# Patient Record
Sex: MALE | Race: WHITE | NOT HISPANIC OR LATINO
[De-identification: names, ages, dates, MRNs, and addresses within clinical notes are randomized per-mention and may not be internally consistent; named-entity substitution may affect disease eponyms.]

---

## 2022-04-15 PROBLEM — Z00.00 ENCOUNTER FOR PREVENTIVE HEALTH EXAMINATION: Status: ACTIVE | Noted: 2022-04-15

## 2022-06-01 ENCOUNTER — APPOINTMENT (OUTPATIENT)
Dept: ORTHOPEDIC SURGERY | Facility: CLINIC | Age: 37
End: 2022-06-01
Payer: OTHER MISCELLANEOUS

## 2022-06-01 PROCEDURE — 99214 OFFICE O/P EST MOD 30 MIN: CPT | Mod: 57

## 2022-06-01 PROCEDURE — 99072 ADDL SUPL MATRL&STAF TM PHE: CPT

## 2022-06-06 NOTE — DISCUSSION/SUMMARY
[de-identified] : I have personally reviewed all previous images as well as imaging reports.  I have reviewed any previous medical records including physical therapy reports, and reports from referring physicians.  We have come to a probable diagnosis.  The patient was explained the diagnosis and treatment options for the condition in great detail.  We discussed the use of injectable as well as oral medications and steroids, physical therapy, oral and topical anti-inflammatories other modalities including surgical treatment.    After reviewing all clinical information provided as well as the available imaging studies, I recommended activity modification and avoidance conditions leading to pain.  We discussed the possibility of prescription strength anti-inflammatory medications as well as use of over-the-counter medications.  We also discussed surgical options to treat this problem.

## 2022-06-06 NOTE — ASSESSMENT
[FreeTextEntry1] : Consent:  Conservative treatment, nontreatment, nonsurgical intervention and surgical intervention treatment options have been reviewed with the patient.  The patient continues to be symptomatic and has failed conservative treatment, and elects to move forward with surgical intervention.  The patient is indicated for left shoulder arthroscopic rotator cuff repair, biceps tenotomy vs tenodesis, subacromial decompression, acromioplasty (, distal clavicle excision) and all indicated procedures. As such the alternatives, benefits and risks, of the above procedure, including but not limited to bleeding, infection, neurovascular injury, loss of limb, loss of life,  DVT, PE, RSD, inability to return to previous level of activity, inability to return to previous level of employment, advancement of or to osteoarthritic changes, joint instability or motion loss, hardware failure or migration, (biceps pain cramping weakness or deformity,) failure to resolve all symptoms, failure to return to sports and need for further procedures, as well as specific risk of need for future joint arthroplasty were discussed with the patient and/or their legal guardian who agreed to move forward with surgical intervention.  They have reviewed and signed the consent form today after expressing understanding of the above documented conversation. The patient or their representative will contact my office as instructed on the preoperative instruction sheet they received today to schedule surgery in a timely manner as discussed.

## 2022-06-06 NOTE — HISTORY OF PRESENT ILLNESS
[7] : 7 [Dull/Aching] : dull/aching [de-identified] : 06/01/2022 \par \par JAY is presenting today for followup. Pain and symptoms are about the same since his last visit. He has been going to PT, but states that despite multiple visits, his pain persists. He has tried injections, medications, and PT extensively at this point. He would like to discuss surgical options. He denies any numbness/tingling/fevers/chills.  [FreeTextEntry1] : Left Shoulder  [de-identified] : PT  [FreeTextEntry5] : Osmani is here to F/U on his LT Shoulder injury.\par Been going to PT But the pain is still in the shoulder \par Range and use of the shoulder is well.\par Just cant shake the pain.\par Pain is at a 7

## 2022-06-06 NOTE — DATA REVIEWED
[MRI] : MRI [Left] : left [Shoulder] : shoulder [Report was reviewed and noted in the chart] : The report was reviewed and noted in the chart [I reviewed the films/CD] : I reviewed the films/CD [FreeTextEntry1] : 12/01/2021: 1. Tear of the superior labrum and posterior labrum extending below the equator with a linear 8 x 2 mm \par posterior superior labral cyst. No definitive involvement of the biceps anchor.\par 2. 4-mm traction cyst and traction edema of infraspinatus proximal to the insertion with no fracture or tear.\par 3. Capsular thickening, which can be seen with adhesive capsulitis.

## 2022-08-08 ENCOUNTER — OUTPATIENT (OUTPATIENT)
Dept: OUTPATIENT SERVICES | Facility: HOSPITAL | Age: 37
LOS: 1 days | End: 2022-08-08
Payer: COMMERCIAL

## 2022-08-08 VITALS
TEMPERATURE: 98 F | SYSTOLIC BLOOD PRESSURE: 144 MMHG | DIASTOLIC BLOOD PRESSURE: 86 MMHG | OXYGEN SATURATION: 98 % | HEART RATE: 68 BPM | WEIGHT: 210.1 LBS | HEIGHT: 68 IN | RESPIRATION RATE: 16 BRPM

## 2022-08-08 DIAGNOSIS — Z01.818 ENCOUNTER FOR OTHER PREPROCEDURAL EXAMINATION: ICD-10-CM

## 2022-08-08 DIAGNOSIS — M67.819 OTHER SPECIFIED DISORDERS OF SYNOVIUM AND TENDON, UNSPECIFIED SHOULDER: ICD-10-CM

## 2022-08-08 DIAGNOSIS — M67.80 OTHER SPECIFIED DISORDERS OF SYNOVIUM AND TENDON, UNSPECIFIED SITE: ICD-10-CM

## 2022-08-08 PROCEDURE — G0463: CPT

## 2022-08-08 NOTE — H&P PST ADULT - HISTORY OF PRESENT ILLNESS
38 yo male states that he  injured left shoulder in October of 2021.  Initially treated with cortisone injection x 1 and PT, with no relief.  Pain increases with ROM of right arm.  Mild relief with Ibuprofen.

## 2022-08-08 NOTE — H&P PST ADULT - PROBLEM SELECTOR PLAN 1
LEFT SHOULDER ARTHROSCOPY TOTATOR CUFF REPAIR LABRAL REPAIR SUBACROMIAL DECOMPRESSION BICEPS TENODESIS POSSIBLE DISTAL CLAVICLE EXCISION

## 2022-08-17 ENCOUNTER — OUTPATIENT (OUTPATIENT)
Dept: OUTPATIENT SERVICES | Facility: HOSPITAL | Age: 37
LOS: 1 days | End: 2022-08-17
Payer: COMMERCIAL

## 2022-08-17 DIAGNOSIS — Z20.828 CONTACT WITH AND (SUSPECTED) EXPOSURE TO OTHER VIRAL COMMUNICABLE DISEASES: ICD-10-CM

## 2022-08-17 LAB — SARS-COV-2 RNA SPEC QL NAA+PROBE: SIGNIFICANT CHANGE UP

## 2022-08-17 PROCEDURE — U0005: CPT

## 2022-08-17 PROCEDURE — U0003: CPT

## 2022-08-19 ENCOUNTER — OUTPATIENT (OUTPATIENT)
Dept: OUTPATIENT SERVICES | Facility: HOSPITAL | Age: 37
LOS: 1 days | End: 2022-08-19
Payer: COMMERCIAL

## 2022-08-19 ENCOUNTER — TRANSCRIPTION ENCOUNTER (OUTPATIENT)
Age: 37
End: 2022-08-19

## 2022-08-19 ENCOUNTER — APPOINTMENT (OUTPATIENT)
Dept: ORTHOPEDIC SURGERY | Facility: HOSPITAL | Age: 37
End: 2022-08-19

## 2022-08-19 VITALS
TEMPERATURE: 97 F | SYSTOLIC BLOOD PRESSURE: 136 MMHG | WEIGHT: 218.48 LBS | RESPIRATION RATE: 27 BRPM | DIASTOLIC BLOOD PRESSURE: 80 MMHG | HEIGHT: 66 IN | HEART RATE: 76 BPM

## 2022-08-19 VITALS
SYSTOLIC BLOOD PRESSURE: 112 MMHG | RESPIRATION RATE: 20 BRPM | DIASTOLIC BLOOD PRESSURE: 64 MMHG | HEART RATE: 81 BPM | OXYGEN SATURATION: 93 %

## 2022-08-19 DIAGNOSIS — M67.819 OTHER SPECIFIED DISORDERS OF SYNOVIUM AND TENDON, UNSPECIFIED SHOULDER: ICD-10-CM

## 2022-08-19 PROCEDURE — C1713: CPT

## 2022-08-19 PROCEDURE — 29826 SHO ARTHRS SRG DECOMPRESSION: CPT | Mod: LT

## 2022-08-19 PROCEDURE — 29806 SHO ARTHRS SRG CAPSULORRAPHY: CPT | Mod: LT

## 2022-08-19 PROCEDURE — 29807 SHO ARTHRS SRG RPR SLAP LES: CPT | Mod: 59,LT

## 2022-08-19 DEVICE — SUT ANCHOR FIBERTAK NO2: Type: IMPLANTABLE DEVICE | Site: LEFT | Status: FUNCTIONAL

## 2022-08-19 DEVICE — IMP SYS BIOCOMP PUSHLOCK SHRT 2.9MM: Type: IMPLANTABLE DEVICE | Site: LEFT | Status: FUNCTIONAL

## 2022-08-19 DEVICE — IMPLANTABLE DEVICE: Type: IMPLANTABLE DEVICE | Site: LEFT | Status: FUNCTIONAL

## 2022-08-19 DEVICE — ANCHOR BIO-COMP 3 FIBERWIRE 5.5X14.7MM: Type: IMPLANTABLE DEVICE | Site: LEFT | Status: FUNCTIONAL

## 2022-08-19 RX ORDER — SODIUM CHLORIDE 9 MG/ML
1000 INJECTION, SOLUTION INTRAVENOUS
Refills: 0 | Status: DISCONTINUED | OUTPATIENT
Start: 2022-08-19 | End: 2022-08-19

## 2022-08-19 RX ORDER — CHLORHEXIDINE GLUCONATE 213 G/1000ML
1 SOLUTION TOPICAL ONCE
Refills: 0 | Status: COMPLETED | OUTPATIENT
Start: 2022-08-19 | End: 2022-08-19

## 2022-08-19 RX ORDER — ONDANSETRON 4 MG/1
4 TABLET, ORALLY DISINTEGRATING ORAL EVERY 8 HOURS
Qty: 21 | Refills: 0 | Status: ACTIVE | COMMUNITY
Start: 2022-08-19 | End: 1900-01-01

## 2022-08-19 RX ORDER — ACETAMINOPHEN EXTRA STRENGTH 500 MG/1
500 TABLET ORAL 3 TIMES DAILY
Qty: 90 | Refills: 0 | Status: ACTIVE | COMMUNITY
Start: 2022-08-19 | End: 1900-01-01

## 2022-08-19 RX ORDER — APREPITANT 80 MG/1
40 CAPSULE ORAL ONCE
Refills: 0 | Status: COMPLETED | OUTPATIENT
Start: 2022-08-19 | End: 2022-08-19

## 2022-08-19 RX ORDER — CEFAZOLIN SODIUM 1 G
2000 VIAL (EA) INJECTION ONCE
Refills: 0 | Status: COMPLETED | OUTPATIENT
Start: 2022-08-19 | End: 2022-08-19

## 2022-08-19 RX ORDER — HYDROMORPHONE HYDROCHLORIDE 2 MG/ML
0.5 INJECTION INTRAMUSCULAR; INTRAVENOUS; SUBCUTANEOUS
Refills: 0 | Status: DISCONTINUED | OUTPATIENT
Start: 2022-08-19 | End: 2022-08-19

## 2022-08-19 RX ORDER — OXYCODONE HYDROCHLORIDE 5 MG/1
5 TABLET ORAL ONCE
Refills: 0 | Status: DISCONTINUED | OUTPATIENT
Start: 2022-08-19 | End: 2022-08-19

## 2022-08-19 RX ORDER — ONDANSETRON 8 MG/1
4 TABLET, FILM COATED ORAL ONCE
Refills: 0 | Status: COMPLETED | OUTPATIENT
Start: 2022-08-19 | End: 2022-08-19

## 2022-08-19 RX ADMIN — CHLORHEXIDINE GLUCONATE 1 APPLICATION(S): 213 SOLUTION TOPICAL at 06:38

## 2022-08-19 RX ADMIN — ONDANSETRON 4 MILLIGRAM(S): 8 TABLET, FILM COATED ORAL at 11:21

## 2022-08-19 RX ADMIN — APREPITANT 40 MILLIGRAM(S): 80 CAPSULE ORAL at 06:37

## 2022-08-19 NOTE — ASU PATIENT PROFILE, ADULT - FALL HARM RISK - UNIVERSAL INTERVENTIONS
Bed in lowest position, wheels locked, appropriate side rails in place/Call bell, personal items and telephone in reach/Instruct patient to call for assistance before getting out of bed or chair/Non-slip footwear when patient is out of bed/[place] to call system/Physically safe environment - no spills, clutter or unnecessary equipment/Purposeful Proactive Rounding/Room/bathroom lighting operational, light cord in reach

## 2022-08-19 NOTE — ASU DISCHARGE PLAN (ADULT/PEDIATRIC) - NS MD DC FALL RISK RISK
For information on Fall & Injury Prevention, visit: [email or web address] OR  [email or web address] OR  https://www.cdc.gov/steadi/patient.html

## 2022-08-31 ENCOUNTER — APPOINTMENT (OUTPATIENT)
Dept: ORTHOPEDIC SURGERY | Facility: CLINIC | Age: 37
End: 2022-08-31

## 2022-08-31 PROCEDURE — 99024 POSTOP FOLLOW-UP VISIT: CPT

## 2022-08-31 RX ORDER — OXYCODONE 5 MG/1
5 TABLET ORAL
Qty: 20 | Refills: 0 | Status: ACTIVE | COMMUNITY
Start: 2022-08-19 | End: 1900-01-01

## 2022-08-31 NOTE — PHYSICAL EXAM
[de-identified] : No erythema, no discharge, no increased warmth to touch. Sutures intact. ROM consistent with immobility due to brace, strength testing deferred due to post-op status. Steri-Strips applied. Distally neurovascularly intact with median, radial, ulnar, MSC, axillary nerves intact. 2+ radial pulse with capillary refill >2 seconds

## 2022-08-31 NOTE — DISCUSSION/SUMMARY
[de-identified] : Assessment & Plan: The patient is approximately 2 weeks s/p LT SLAP repair .Sutures removed and Steri Strips applied today. The patient is instructed in wound management. The patient's post-op plan, protocol and activity modifications have been thoroughly discussed and the patient expressed understanding. The patient will control pain as discussed & continue ice and elevation as needed. The patient otherwise may advance activity as discussed. Continue sling use and will start PT. Will also renew oxycodone. Follow up in 4 weeks.\par \par All of the patient's questions were answered to His satisfaction. Diagnoses and potential treatments were reviewed. He agreed with the plan and would like to move forward with it. \par \par History, physical exam, imaging, assessment and plan documented by John Paul Butler. The documentation recorded by the scribe accurately reflects the service I, Pedro Gallardo MD, personally performed and the decisions made by me.

## 2022-08-31 NOTE — HISTORY OF PRESENT ILLNESS
[Dull/Aching] : dull/aching [Sharp] : sharp [Constant] : constant [Leisure] : leisure [Sleep] : sleep [Work related] : work related [de-identified] : Pt presents for POST OP #1 DOS: 8/19/2022 LT SLAP repair. Pt denies fever, no chills, no loss of sensation to extremity. Using shoulder sling. [] : no [FreeTextEntry1] : left shoulder  [FreeTextEntry3] : 10/28/21 [FreeTextEntry5] : pt states pain is bad today. pain with and without movement  [de-identified] : movement  [de-identified] : 08/19/22 [de-identified] : L shoulder labral repair

## 2022-09-01 PROBLEM — J45.990 EXERCISE INDUCED BRONCHOSPASM: Chronic | Status: ACTIVE | Noted: 2022-08-08

## 2022-09-01 PROBLEM — M25.512 PAIN IN LEFT SHOULDER: Chronic | Status: ACTIVE | Noted: 2022-08-08

## 2022-09-02 ENCOUNTER — RX RENEWAL (OUTPATIENT)
Age: 37
End: 2022-09-02

## 2022-09-02 RX ORDER — IBUPROFEN 600 MG/1
600 TABLET, FILM COATED ORAL 4 TIMES DAILY
Qty: 60 | Refills: 0 | Status: ACTIVE | COMMUNITY
Start: 2022-08-19 | End: 1900-01-01

## 2022-09-28 ENCOUNTER — APPOINTMENT (OUTPATIENT)
Dept: ORTHOPEDIC SURGERY | Facility: CLINIC | Age: 37
End: 2022-09-28

## 2022-09-28 PROCEDURE — 99024 POSTOP FOLLOW-UP VISIT: CPT

## 2022-09-28 NOTE — DISCUSSION/SUMMARY
[de-identified] : Assessment & Plan: The patient is approximately 6 weeks s/p LT SLAP repair . The patient is instructed in wound management. The patient's post-op plan, protocol and activity modifications have been thoroughly discussed and the patient expressed understanding. The patient will control pain as discussed & continue ice and elevation as needed. The patient otherwise may advance activity as discussed. \par \par Continue PT.\par May discontinue use of ultra sling.\par OOW at least 6 weeks, will re-evaluate.\par Follow up in 6 weeks.\par \par All of the patient's questions were answered to His satisfaction. Diagnoses and potential treatments were reviewed. He agreed with the plan and would like to move forward with it.\par \par History, physical exam, imaging, assessment and plan documented by John Paul Mendez. The documentation recorded by the scribe accurately reflects the service I, Pedro Gallardo MD, personally performed and the decisions made by me.

## 2022-09-28 NOTE — HISTORY OF PRESENT ILLNESS
[Work related] : work related [Dull/Aching] : dull/aching [Intermittent] : intermittent [Sleep] : sleep [Lying in bed] : lying in bed [Physical therapy] : physical therapy [de-identified] : Pt presents for POST OP #2 DOS: 8/19/2022 LT SLAP repair. Pt denies fever, no chills, no loss of sensation to extremity. Using shoulder sling. Pain has improved from previous visit. [] : no [FreeTextEntry3] : 10/28/21 [FreeTextEntry5] : pt states he is experiencing pain at night and with movement.  [de-identified] : physical therapy

## 2022-09-28 NOTE — PHYSICAL EXAM
[de-identified] : No erythema, no discharge, no increased warmth to touch. Healed incisions. ROM improving, appropriately stiff, strength testing deferred due to post-op status. Distally neurovascularly intact with median, radial, ulnar, MSC, axillary nerves intact. 2+ radial pulse with capillary refill >2 seconds.

## 2022-09-28 NOTE — WORK
[Total] : total [Does not reveal pre-existing condition(s) that may affect treatment/prognosis] : does not reveal pre-existing condition(s) that may affect treatment/prognosis [Cannot return to work because ________] : cannot return to work because [unfilled] [Unknown at this time] : : unknown at this time [Patient] : patient [I provided the services listed above] :  I provided the services listed above. [FreeTextEntry1] : will determine in 6 weeks

## 2022-11-09 ENCOUNTER — APPOINTMENT (OUTPATIENT)
Dept: ORTHOPEDIC SURGERY | Facility: CLINIC | Age: 37
End: 2022-11-09

## 2022-11-30 ENCOUNTER — APPOINTMENT (OUTPATIENT)
Dept: ORTHOPEDIC SURGERY | Facility: CLINIC | Age: 37
End: 2022-11-30

## 2022-11-30 PROCEDURE — 99024 POSTOP FOLLOW-UP VISIT: CPT

## 2022-11-30 RX ORDER — METHYLPREDNISOLONE 4 MG/1
4 TABLET ORAL
Qty: 1 | Refills: 0 | Status: ACTIVE | COMMUNITY
Start: 2022-11-30 | End: 1900-01-01

## 2022-11-30 NOTE — HISTORY OF PRESENT ILLNESS
[Work related] : work related [Dull/Aching] : dull/aching [Light duty] : Work status: light duty [de-identified] : Pt presents for POST OP #3 DOS: 8/19/2022 LT SLAP repair. Pt denies fever, no chills, no loss of sensation to extremity. Going to PT regularly, Pain has improved from previous visit. He has returned to work, light duty. [] : no [FreeTextEntry1] : left shoulder  [FreeTextEntry3] : 10/28/21 [FreeTextEntry5] : JAY is here today for left shoulder follow up. pt states since last visit, pain decreased. pain with lifting and reaching behind. \par s/p ~14 weeks L shoulder labral repair  [de-identified] : reaching/lifting  [de-identified] : 08/19/22

## 2022-11-30 NOTE — WORK
[Partial] : partial [Does not reveal pre-existing condition(s) that may affect treatment/prognosis] : does not reveal pre-existing condition(s) that may affect treatment/prognosis [Can return to work with limitations on ______] : can return to work with limitations on [unfilled] [Lifting] : lifting [Unknown at this time] : : unknown at this time [Patient] : patient [I provided the services listed above] :  I provided the services listed above. [FreeTextEntry1] : will determine in 6 weeks

## 2022-11-30 NOTE — PHYSICAL EXAM
[de-identified] : No erythema, no discharge, no increased warmth to touch. Healed incisions. ROM improving, appropriately stiff, strength testing deferred due to post-op status. Distally neurovascularly intact with median, radial, ulnar, MSC, axillary nerves intact. 2+ radial pulse with capillary refill >2 seconds.

## 2022-12-16 ENCOUNTER — TRANSCRIPTION ENCOUNTER (OUTPATIENT)
Age: 37
End: 2022-12-16

## 2023-01-18 ENCOUNTER — APPOINTMENT (OUTPATIENT)
Dept: ORTHOPEDIC SURGERY | Facility: CLINIC | Age: 38
End: 2023-01-18
Payer: OTHER MISCELLANEOUS

## 2023-01-18 VITALS — HEIGHT: 66 IN | WEIGHT: 220 LBS | BODY MASS INDEX: 35.36 KG/M2

## 2023-01-18 DIAGNOSIS — Z78.9 OTHER SPECIFIED HEALTH STATUS: ICD-10-CM

## 2023-01-18 PROCEDURE — 99072 ADDL SUPL MATRL&STAF TM PHE: CPT

## 2023-01-18 PROCEDURE — 99214 OFFICE O/P EST MOD 30 MIN: CPT

## 2023-01-18 NOTE — DISCUSSION/SUMMARY
[de-identified] : Assessment: The patient is a 38 year old male with L shoulder pain and physical exam findings consistent with s/p L shoulder labral repair.\par \par Patient and I discussed their symptoms. Discussed findings of today's exam and possible causes of patient's pain. Educated patient on their most probable diagnosis. Reviewed possible courses of treatment, and we collaboratively decided best course of treatment at this time will include:\par \par 1. MRI L shoulder\par 2. OOW\par \par The patient's current medication management of their orthopedic diagnosis was reviewed today: \par \par (1) We discussed a comprehensive treatment plan that included possible pharmaceutical management involving the use of prescription strength medications including but not limited to options such as oral Naprosyn 500mg BID, once daily Meloxicam 15 mg, or 500-650 mg Tylenol versus over the counter oral medications and topical prescription NSAID Pennsaid vs over the counter Voltaren gel. \par \par (2) There is a moderate risk of morbidity with further treatment, especially from use of prescription strength medications and possible side effects of these medications which include upset stomach with oral medications, skin reactions to topical medications and cardiac/renal issues with long term use. \par \par (3) I recommended that the patient follow-up with their medical physician to discuss any significant specific potential issues with long term medication use such as interactions with current medications or with exacerbation of underlying medical comorbidities. \par \par (4) The benefits and risks associated with use of injectable, oral or topical, prescription and over the counter anti-inflammatory medications were discussed with the patient. The patient voiced understanding of the risks including but not limited to bleeding, stroke, kidney dysfunction, heart disease, and were referred to the black box warning label for further information.\par \par Prior to appointment and during encounter with patient extensive medical records were reviewed including but not limited to, hospital records, out patient records, imaging results, and lab data. During this appointment the patient was examined, diagnoses were discussed and explained in a face to face manner. In addition extensive time was spent reviewing aforementioned diagnostic studies. Counseling including abnormal image results, differential diagnoses, treatment options, risk and benefits, lifestyle changes, current condition, and current medications was performed. Patient's comments, questions, and concerns were address and patient verbalized understanding.\par \par Follow up after MRI. \par \par History, physical exam, imaging, assessment and plan documented by John Paul Mendez. The documentation recorded by the scribe accurately reflects the service I, Pedro Gallardo MD, personally performed and the decisions made by me.

## 2023-01-18 NOTE — PHYSICAL EXAM
[de-identified] : No erythema, no discharge, no increased warmth to touch. Healed incisions. ROM improving, appropriately stiff, strength testing deferred due to post-op status. Distally neurovascularly intact with median, radial, ulnar, MSC, axillary nerves intact. 2+ radial pulse with capillary refill >2 seconds.

## 2023-01-18 NOTE — WORK
[Does not reveal pre-existing condition(s) that may affect treatment/prognosis] : does not reveal pre-existing condition(s) that may affect treatment/prognosis [Lifting] : lifting [Unknown at this time] : : unknown at this time [Patient] : patient [I provided the services listed above] :  I provided the services listed above. [Partial] : partial [Can return to work with limitations on ______] : can return to work with limitations on [unfilled] [No Rx restrictions] : No Rx restrictions.

## 2023-01-18 NOTE — HISTORY OF PRESENT ILLNESS
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 pt states since last visit, pain decreased. pain with lifting and reaching behind. \par s/p ~14 weeks L shoulder labral repair  [de-identified] : reaching/lifting  [de-identified] : 08/19/22

## 2023-01-31 ENCOUNTER — FORM ENCOUNTER (OUTPATIENT)
Age: 38
End: 2023-01-31

## 2023-02-01 ENCOUNTER — APPOINTMENT (OUTPATIENT)
Dept: MRI IMAGING | Facility: CLINIC | Age: 38
End: 2023-02-01
Payer: OTHER MISCELLANEOUS

## 2023-02-01 PROCEDURE — 99072 ADDL SUPL MATRL&STAF TM PHE: CPT

## 2023-02-01 PROCEDURE — 73221 MRI JOINT UPR EXTREM W/O DYE: CPT | Mod: LT

## 2023-02-08 ENCOUNTER — APPOINTMENT (OUTPATIENT)
Dept: ORTHOPEDIC SURGERY | Facility: CLINIC | Age: 38
End: 2023-02-08
Payer: OTHER MISCELLANEOUS

## 2023-02-08 PROCEDURE — 99072 ADDL SUPL MATRL&STAF TM PHE: CPT

## 2023-02-08 PROCEDURE — 20550 NJX 1 TENDON SHEATH/LIGAMENT: CPT

## 2023-02-08 PROCEDURE — 99214 OFFICE O/P EST MOD 30 MIN: CPT | Mod: 25

## 2023-02-14 NOTE — DISCUSSION/SUMMARY
[de-identified] : Assessment: The patient is a 38 year old male s/p left arthroscopic labral repair 8/19/2022 now with anterior shoulder pain that is new in onset and not similar to the symptoms he experienced previously. MRI is consistent with biceps tendonitis.\par \par Patient and I discussed their symptoms. Discussed findings of today's exam and possible causes of patient's pain. Educated patient on their most probable diagnosis. Reviewed possible courses of treatment, and we collaboratively decided best course of treatment at this time will include:\par \par 1. Will attempt a therapeutic and diagnostic biceps sheath CSI today under ultrasound guidance\par 2. PT\par 3. If no lasting improvement, will consider revision labral repair with possibility of biceps tenodesis.\par \par The patient's current medication management of their orthopedic diagnosis was reviewed today: \par \par (1) We discussed a comprehensive treatment plan that included possible pharmaceutical management involving the use of prescription strength medications including but not limited to options such as oral Naprosyn 500mg BID, once daily Meloxicam 15 mg, or 500-650 mg Tylenol versus over the counter oral medications and topical prescription NSAID Pennsaid vs over the counter Voltaren gel. \par \par (2) There is a moderate risk of morbidity with further treatment, especially from use of prescription strength medications and possible side effects of these medications which include upset stomach with oral medications, skin reactions to topical medications and cardiac/renal issues with long term use. \par \par (3) I recommended that the patient follow-up with their medical physician to discuss any significant specific potential issues with long term medication use such as interactions with current medications or with exacerbation of underlying medical comorbidities. \par \par (4) The benefits and risks associated with use of injectable, oral or topical, prescription and over the counter anti-inflammatory medications were discussed with the patient. The patient voiced understanding of the risks including but not limited to bleeding, stroke, kidney dysfunction, heart disease, and were referred to the black box warning label for further information.\par \par Prior to appointment and during encounter with patient extensive medical records were reviewed including but not limited to, hospital records, out patient records, imaging results, and lab data. During this appointment the patient was examined, diagnoses were discussed and explained in a face to face manner. In addition extensive time was spent reviewing aforementioned diagnostic studies. Counseling including abnormal image results, differential diagnoses, treatment options, risk and benefits, lifestyle changes, current condition, and current medications was performed. Patient's comments, questions, and concerns were address and patient verbalized understanding.\par \par Follow up in 4-6 weeks.

## 2023-02-14 NOTE — DATA REVIEWED
[MRI] : MRI [Left] : left [Shoulder] : shoulder [Report was reviewed and noted in the chart] : The report was reviewed and noted in the chart [I independently reviewed and interpreted images and report] : I independently reviewed and interpreted images and report [FreeTextEntry1] : 1. Postop repair for posterior labral tear with no fluid undermining the labrum to diagnose a recurrent tear.\par Resolution of prior noted labral cyst. Cartilage loss with marrow edema and narrowing of the posterior\par glenohumeral joint with persistent posterior subluxation of the humeral head.\par 2. Stable fraying and tear of the superior labrum.\par 3. Interval development of biceps tenosynovitis.\par 4. Stable anterior capsular thickening, which can be seen with adhesive capsulitis.

## 2023-02-14 NOTE — PROCEDURE
[Tendon Sheath] : tendon sheath [Left] : of the left [Proximal biceps tendon sheath] : proximal biceps tendon sheath [Pain] : pain [Inflammation] : inflammation [Alcohol] : alcohol [Betadine] : betadine [Ethyl Chloride sprayed topically] : ethyl chloride sprayed topically [Sterile technique used] : sterile technique used [___ cc    6mg] :  Betamethasone (Celestone) ~Vcc of 6mg [___ cc    1%] : Lidocaine ~Vcc of 1%  [___ cc    0.25%] : Bupivacaine (Marcaine) ~Vcc of 0.25%  [] : Patient tolerated procedure well [Call if redness, pain or fever occur] : call if redness, pain or fever occur [Apply ice for 15min out of every hour for the next 12-24 hours as tolerated] : apply ice for 15 minutes out of every hour for the next 12-24 hours as tolerated [Previous OTC use and PT nontherapeutic] : patient has tried OTC's including aspirin, Ibuprofen, Aleve, etc or prescription NSAIDS, and/or exercises at home and/or physical therapy without satisfactory response [Risks, benefits, alternatives discussed / Verbal consent obtained] : the risks benefits, and alternatives have been discussed, and verbal consent was obtained [Precise injection in area of tear] : precise injection in area of tear [All ultrasound images have been permanently captured and stored accordingly in our picture archiving and communication system] : All ultrasound images have been permanently captured and stored accordingly in our picture archiving and communication system [Visualization of the needle and placement of injection was performed without complication] : visualization of the needle and placement of injection was performed without complication

## 2023-02-14 NOTE — PHYSICAL EXAM
[de-identified] : No erythema, no discharge, no increased warmth to touch. Healed incisions. \par \par The patient is a well appearing 38 year year old male of their stated age.\par Neck is supple & nontender to palpation. Negative Spurling's test.\par \par General: in no acute distress, seated comfortably, moving easily\par Skin: No discoloration, rashes; on palpation skin is dry, \par Neuro: Normal sensation all dermatomes, motor all myotomes\par Vascular: Normal pulses, no edema, normal temperature\par Coordination and balance: Normal\par Psych: normal mood and affect, non pressured speech, alert and oriented x3\par \par Effected Shoulder \par Inspection:\par Scapula Winging: Negative\par Deformity: None\par Erythema: None\par Ecchymosis: None\par Abrasions: None\par Effusion: None\par \par Range of Motion:\par Active Forward Flexion: 160 degrees \par Passive Forward Flexion: 170 degrees \par Active IR : L4\par Passive ER : 30 degrees\par \par Motor Exam:\par Forward Flexion: 4+ out of 5\par Flexion Plane of Scapula: 5 out of 5\par Abduction: 4+ out of 5\par Internal Rotation: 5 out of 5\par External Rotation: 4+ out of 5\par Distal Motor Strength: 5 out of 5\par \par Stability Testing:\par Anterior: 1+\par Posterior: 1+\par Sulcus N: 1+\par Sulcus ER: 1+\par \par Provocative Tests:\par Drop Arm: Negative\par [place]/Impingement: Positive\par [place]: Positive\par X-Arm Adduction: Negative\par Belly Press: Negative\par Bear Hug: Negative\par Lift Off: Negative\par Apprehension: Negative\par Relocation: Negative\par Posterior Load & Shift: Negative\par \par Palpation:\par AC Joint: Nontender\par Clavicle: Nontender\par SC Joint: Nontender\par Bicepital Groove: Positive\par Coracoid Process: Nontender\par Pectoralis Minor Tendon: Nontender\par Pectoralis Major Tendon: Nontender & palpably intact\par Latissimus Dorsi: Nontender \par Proximal Humerus: Positive\par Scapula Body: Nontender\par Medial Scapula Boarder: Nontender\par Scapula Spine: Nontender\par \par Neurologic Exam: Sensation to Light Touch:\par Axillary: Grossly intact\par Ulnar: Grossly intact\par Radial: Grossly intact\par Median: Grossly intact\par Other:  N/A\par \par Circulatory/Pulses:\par Ulnar: 2+\par Radial: 2+\par Other Pertinent Findings: None\par \par Contralateral Shoulder\par Range of Motion:\par Active Forward Flexion: 180 degrees \par Active Abduction: 180 degrees \par Passive Forward Flexion: 180 degrees \par Passive Abduction: 180 degrees \par ER @ 90 degrees: 90 degrees\par IR @ 90 degrees: 45 degrees\par ER @ 0 degrees: 50 degrees\par \par Motor Exam:\par Forward Flexion: 5 out of 5\par Flexion Plane of Scapula: 5 out of 5\par Abduction: 5 out of 5\par Internal Rotation: 5 out of 5\par External Rotation: 5 out of 5\par Distal Motor Strength: 5 out of 5\par \par Stability Testing:\par Anterior: 1+\par Posterior: 1+\par Sulcus N: 1+\par Sulcus ER: 1+\par \par Other Pertinent Findings: None\par

## 2023-02-14 NOTE — WORK
[Partial] : partial [Does not reveal pre-existing condition(s) that may affect treatment/prognosis] : does not reveal pre-existing condition(s) that may affect treatment/prognosis [Can return to work with limitations on ______] : can return to work with limitations on [unfilled] [Lifting] : lifting [Unknown at this time] : : unknown at this time [Patient] : patient [No Rx restrictions] : No Rx restrictions. [I provided the services listed above] :  I provided the services listed above.

## 2023-02-14 NOTE — HISTORY OF PRESENT ILLNESS
[Work related] : work related [Dull/Aching] : dull/aching [Throbbing] : throbbing [Constant] : constant [de-identified] : 02/08/2023 \par \zulema THOMPSON is presenting today for followup. Pain and symptoms are similar to the previous visit. He denies any numbness/tingling/fevers/chills. He underwent an MRI since last visit, and is here to discuss the imaging results.  [] : no [FreeTextEntry3] : 10/28/2021 [FreeTextEntry5] : JAY is here for Left Shoulder, Pt states having no Changes since last visit, states still having difficulty doing his daily activity.Pt states after having the surgery the Right Shoulder was improving but states the pain has increased

## 2023-03-01 ENCOUNTER — APPOINTMENT (OUTPATIENT)
Dept: ORTHOPEDIC SURGERY | Facility: CLINIC | Age: 38
End: 2023-03-01
Payer: OTHER MISCELLANEOUS

## 2023-03-01 PROCEDURE — 99072 ADDL SUPL MATRL&STAF TM PHE: CPT

## 2023-03-01 PROCEDURE — 99214 OFFICE O/P EST MOD 30 MIN: CPT | Mod: 57

## 2023-03-05 NOTE — DISCUSSION/SUMMARY
[de-identified] : Plan: Patient understands that He is a candidate for revision shoulder arthroscopy, with biceps tenodesis, and possible labral repair. Discussed [] . Discussed non-operative and operative treatment options including possible need for revision surgery. All questions and concerns regarding the surgery were addressed. Went over the recovery timeline and expected outcomes following surgery. Patient elected to move forward with the surgical procedure.\par \par Tests Ordered: Post-op and COVID \par Prescription Medications Ordered: none\par Braces/DME Ordered: PAD sling\par Activity/Work/Sports Status: working light duty\par Additional Instructions: none\par Follow-Up: 2 weeks post-op \par \par The patient's current medication management of their orthopedic diagnosis was reviewed today:\par (1) We discussed a comprehensive treatment plan that included possible pharmaceutical management involving the use of prescription strength medications including but not limited to options such as oral Naprosyn 500mg BID, once daily Meloxicam 15 mg, or 500-650 mg Tylenol versus over the counter oral medications and topical prescription NSAID Pennsaid vs over the counter Voltaren gel.\par \par (2) There is a moderate risk of morbidity with further treatment, especially from use of prescription strength medications and possible side effects of these medications which include upset stomach with oral medications, skin reactions to topical medications and cardiac/renal issues with long term use.\par \par (3) I recommended that the patient follow-up with their medical physician to discuss any significant specific potential issues with long term medication use such as interactions with current medications or with exacerbation of underlying medical comorbidities.\par \par (4) The benefits and risks associated with use of injectable, oral or topical, prescription and over the counter anti-inflammatory medications were discussed with the patient. The patient voiced understanding of the risks including but not limited to bleeding, stroke, kidney dysfunction, heart disease, and were referred to the black box warning label for further information. \par \par Consent:  Conservative treatment, nontreatment, nonsurgical intervention and surgical intervention treatment options have been reviewed with the patient.  The patient continues to be symptomatic and has failed conservative treatment, and elects to move forward with surgical intervention.  The patient is indicated for [the above procedure] and all indicated procedures. As such the alternatives, benefits and risks, of the above procedure, including but not limited to bleeding, infection, neurovascular injury, loss of limb, loss of life,  DVT, PE, RSD, inability to return to previous level of activity, inability to return to previous level of employment, advancement of or to osteoarthritic changes, joint instability or motion loss, hardware failure or migration, [malunion or nonunion,] failure to resolve all symptoms, failure to return to sports and need for further procedures, as well as specific risk of [none] were discussed with the patient and/or their legal guardian who agreed to move forward with surgical intervention.  They have reviewed and signed the consent form today after expressing understanding of the above documented conversation. The patient or their representative will contact my office as instructed on the preoperative instruction sheet they received today to schedule surgery in a timely manner as discussed. \par \par As a post-operative protocol, I am prescribing an iceless cold/heat compression therapy device for at home use to be used 3-5 times per day at 40 degrees for 35 days as an alternative to pain medication. I would like my patient to begin with simultaneous cold & compression therapy at 10mm pressure. At the patients follow up I will determine whether they should continue with cold, or if they should transition to contrast cold/heat compression therapy. Unlike a conventional cold therapy unit that requires ice, the ThermX iceless device is set to a prescribed temperature that it will remain throughout the entire duration of use, whether that be cold compression, heat compression, or contrast compression. Cold therapy units that depend on ice melt over a very short period and do not provide compression which limits the compliance and effectiveness for pain/inflammation reduction that I am targeting for my patient. I have reached out to [facility] to supply this device as they are the exclusive provider of the ThermX and the patient will be contacted and instructed on how to utilize the device.

## 2023-03-05 NOTE — WORK
[Partial] : partial [Does not reveal pre-existing condition(s) that may affect treatment/prognosis] : does not reveal pre-existing condition(s) that may affect treatment/prognosis [Lifting] : lifting [Patient] : patient [I provided the services listed above] :  I provided the services listed above. [Unknown at this time] : : unknown at this time

## 2023-03-05 NOTE — HISTORY OF PRESENT ILLNESS
[Work related] : work related [Dull/Aching] : dull/aching [Throbbing] : throbbing [Constant] : constant [de-identified] : 03/01/2023 \par \zulema THOMPSON is presenting today for followup. Pain and symptoms are the same. He has pain in anterior shoulder. Pain only temporarily improved after CSI at last visit. He has been working light duty. He denies any numbness/tingling/fevers/chills.  [] : no [FreeTextEntry3] : 10/28/2021 [FreeTextEntry5] : JAY is here for Left Shoulder, reports on going pain , pt has some relief in office after CSI  , but pain returned when he started to drive

## 2023-03-05 NOTE — PHYSICAL EXAM
[de-identified] : No erythema, no discharge, no increased warmth to touch. Healed incisions. \par \par The patient is a well appearing 38 year year old male of their stated age.\par Neck is supple & nontender to palpation. Negative Spurling's test.\par \par General: in no acute distress, seated comfortably, moving easily\par Skin: No discoloration, rashes; on palpation skin is dry, \par Neuro: Normal sensation all dermatomes, motor all myotomes\par Vascular: Normal pulses, no edema, normal temperature\par Coordination and balance: Normal\par Psych: normal mood and affect, non pressured speech, alert and oriented x3\par \par Effected Shoulder \par Inspection:\par Scapula Winging: Negative\par Deformity: None\par Erythema: None\par Ecchymosis: None\par Abrasions: None\par Effusion: None\par \par Range of Motion:\par Active Forward Flexion: 160 degrees \par Passive Forward Flexion: 170 degrees \par Active IR : L4\par Passive ER : 30 degrees\par \par Motor Exam:\par Forward Flexion: 4+ out of 5\par Flexion Plane of Scapula: 5 out of 5\par Abduction: 4+ out of 5\par Internal Rotation: 5 out of 5\par External Rotation: 4+ out of 5\par Distal Motor Strength: 5 out of 5\par \par Stability Testing:\par Anterior: 1+\par Posterior: 1+\par Sulcus N: 1+\par Sulcus ER: 1+\par \par Provocative Tests:\par Drop Arm: Negative\par Conley/Impingement: Positive\par Lee: Positive\par X-Arm Adduction: Negative\par Belly Press: Negative\par Bear Hug: Negative\par Lift Off: Negative\par Apprehension: Negative\par Relocation: Negative\par Posterior Load & Shift: Negative\par \par Palpation:\par AC Joint: Nontender\par Clavicle: Nontender\par SC Joint: Nontender\par Bicepital Groove: Positive\par Coracoid Process: Nontender\par Pectoralis Minor Tendon: Nontender\par Pectoralis Major Tendon: Nontender & palpably intact\par Latissimus Dorsi: Nontender \par Proximal Humerus: Positive\par Scapula Body: Nontender\par Medial Scapula Boarder: Nontender\par Scapula Spine: Nontender\par \par Neurologic Exam: Sensation to Light Touch:\par Axillary: Grossly intact\par Ulnar: Grossly intact\par Radial: Grossly intact\par Median: Grossly intact\par Other:  N/A\par \par Circulatory/Pulses:\par Ulnar: 2+\par Radial: 2+\par Other Pertinent Findings: None\par \par Contralateral Shoulder\par Range of Motion:\par Active Forward Flexion: 180 degrees \par Active Abduction: 180 degrees \par Passive Forward Flexion: 180 degrees \par Passive Abduction: 180 degrees \par ER @ 90 degrees: 90 degrees\par IR @ 90 degrees: 45 degrees\par ER @ 0 degrees: 50 degrees\par \par Motor Exam:\par Forward Flexion: 5 out of 5\par Flexion Plane of Scapula: 5 out of 5\par Abduction: 5 out of 5\par Internal Rotation: 5 out of 5\par External Rotation: 5 out of 5\par Distal Motor Strength: 5 out of 5\par \par Stability Testing:\par Anterior: 1+\par Posterior: 1+\par Sulcus N: 1+\par Sulcus ER: 1+\par \par Other Pertinent Findings: None\par

## 2023-05-03 ENCOUNTER — OUTPATIENT (OUTPATIENT)
Dept: OUTPATIENT SERVICES | Facility: HOSPITAL | Age: 38
LOS: 1 days | End: 2023-05-03
Payer: COMMERCIAL

## 2023-05-03 VITALS
TEMPERATURE: 97 F | HEIGHT: 66 IN | DIASTOLIC BLOOD PRESSURE: 88 MMHG | HEART RATE: 75 BPM | WEIGHT: 216.93 LBS | RESPIRATION RATE: 16 BRPM | OXYGEN SATURATION: 98 % | SYSTOLIC BLOOD PRESSURE: 140 MMHG

## 2023-05-03 DIAGNOSIS — M25.512 PAIN IN LEFT SHOULDER: ICD-10-CM

## 2023-05-03 DIAGNOSIS — Z01.818 ENCOUNTER FOR OTHER PREPROCEDURAL EXAMINATION: ICD-10-CM

## 2023-05-03 DIAGNOSIS — Z98.890 OTHER SPECIFIED POSTPROCEDURAL STATES: Chronic | ICD-10-CM

## 2023-05-03 DIAGNOSIS — S43.439A SUPERIOR GLENOID LABRUM LESION OF UNSPECIFIED SHOULDER, INITIAL ENCOUNTER: ICD-10-CM

## 2023-05-03 PROCEDURE — G0463: CPT

## 2023-05-03 RX ORDER — ACETAMINOPHEN 500 MG
2 TABLET ORAL
Qty: 0 | Refills: 0 | DISCHARGE

## 2023-05-03 RX ORDER — IBUPROFEN 200 MG
1 TABLET ORAL
Qty: 0 | Refills: 0 | DISCHARGE

## 2023-05-03 NOTE — H&P PST ADULT - PROBLEM SELECTOR PLAN 1
Left shoulder diagnostic arthroscopy biceps tenodesis possible labral repair slap repair is planned for 5/16/2023  Pre op instructions were reviewed  best wishes offered

## 2023-05-03 NOTE — H&P PST ADULT - MUSCULOSKELETAL
no joint swelling/no joint erythema/no joint warmth/no calf tenderness/decreased ROM due to pain details…

## 2023-05-03 NOTE — H&P PST ADULT - HISTORY OF PRESENT ILLNESS
37 yo male reports left shoulder injury 10/2021 with surgical repair 8/2022.  he states he did not obtain relief from pain and he is now scheduled for left shoulder diagnostic arthroscopy biceps tenodesis possible labral repair slap repair on 5/16/2023 @ [facility].

## 2023-05-03 NOTE — H&P PST ADULT - ASSESSMENT
37 yo male is scheduled for [facility] shoulder diagnostic arthroscopy biceps tenodesis possible labral repair slap repair on 5/16/2023

## 2023-05-15 ENCOUNTER — TRANSCRIPTION ENCOUNTER (OUTPATIENT)
Age: 38
End: 2023-05-15

## 2023-05-16 ENCOUNTER — TRANSCRIPTION ENCOUNTER (OUTPATIENT)
Age: 38
End: 2023-05-16

## 2023-05-16 ENCOUNTER — APPOINTMENT (OUTPATIENT)
Dept: ORTHOPEDIC SURGERY | Facility: HOSPITAL | Age: 38
End: 2023-05-16
Payer: OTHER MISCELLANEOUS

## 2023-05-16 ENCOUNTER — OUTPATIENT (OUTPATIENT)
Dept: OUTPATIENT SERVICES | Facility: HOSPITAL | Age: 38
LOS: 1 days | End: 2023-05-16
Payer: COMMERCIAL

## 2023-05-16 VITALS
DIASTOLIC BLOOD PRESSURE: 88 MMHG | HEART RATE: 61 BPM | WEIGHT: 220.02 LBS | OXYGEN SATURATION: 100 % | SYSTOLIC BLOOD PRESSURE: 142 MMHG | HEIGHT: 66 IN | TEMPERATURE: 99 F | RESPIRATION RATE: 12 BRPM

## 2023-05-16 VITALS
OXYGEN SATURATION: 97 % | RESPIRATION RATE: 18 BRPM | DIASTOLIC BLOOD PRESSURE: 69 MMHG | SYSTOLIC BLOOD PRESSURE: 131 MMHG | HEART RATE: 79 BPM

## 2023-05-16 DIAGNOSIS — S43.439A SUPERIOR GLENOID LABRUM LESION OF UNSPECIFIED SHOULDER, INITIAL ENCOUNTER: ICD-10-CM

## 2023-05-16 DIAGNOSIS — Z01.818 ENCOUNTER FOR OTHER PREPROCEDURAL EXAMINATION: ICD-10-CM

## 2023-05-16 DIAGNOSIS — Z98.890 OTHER SPECIFIED POSTPROCEDURAL STATES: Chronic | ICD-10-CM

## 2023-05-16 PROCEDURE — 29824 SHO ARTHRS SRG DSTL CLAVICLC: CPT | Mod: 59,LT

## 2023-05-16 PROCEDURE — 29828 SHO ARTHRS SRG BICP TENODSIS: CPT | Mod: 59,LT

## 2023-05-16 PROCEDURE — 29820 SHO ARTHRS SRG PRTL SYNVCT: CPT | Mod: 59,LT

## 2023-05-16 PROCEDURE — C1713: CPT

## 2023-05-16 PROCEDURE — 29823 SHO ARTHRS SRG XTNSV DBRDMT: CPT | Mod: LT

## 2023-05-16 PROCEDURE — 29826 SHO ARTHRS SRG DECOMPRESSION: CPT | Mod: LT

## 2023-05-16 PROCEDURE — 29826 SHO ARTHRS SRG DECOMPRESSION: CPT

## 2023-05-16 PROCEDURE — 29823 SHO ARTHRS SRG XTNSV DBRDMT: CPT

## 2023-05-16 PROCEDURE — 29828 SHO ARTHRS SRG BICP TENODSIS: CPT | Mod: AS,LT

## 2023-05-16 PROCEDURE — 29824 SHO ARTHRS SRG DSTL CLAVICLC: CPT

## 2023-05-16 PROCEDURE — 29828 SHO ARTHRS SRG BICP TENODSIS: CPT | Mod: LT

## 2023-05-16 PROCEDURE — 29824 SHO ARTHRS SRG DSTL CLAVICLC: CPT | Mod: AS,LT

## 2023-05-16 DEVICE — ANCHOR SWIVLCK TENO BIO-COMP LOOP N TACK 3.9MM: Type: IMPLANTABLE DEVICE | Site: LEFT | Status: FUNCTIONAL

## 2023-05-16 RX ORDER — ACETAMINOPHEN 500 MG
2 TABLET ORAL
Qty: 42 | Refills: 0
Start: 2023-05-16 | End: 2023-05-22

## 2023-05-16 RX ORDER — OMEPRAZOLE 10 MG/1
1 CAPSULE, DELAYED RELEASE ORAL
Qty: 14 | Refills: 0
Start: 2023-05-16 | End: 2023-05-29

## 2023-05-16 RX ORDER — ONDANSETRON 8 MG/1
4 TABLET, FILM COATED ORAL ONCE
Refills: 0 | Status: DISCONTINUED | OUTPATIENT
Start: 2023-05-16 | End: 2023-05-17

## 2023-05-16 RX ORDER — OXYCODONE HYDROCHLORIDE 5 MG/1
5 TABLET ORAL ONCE
Refills: 0 | Status: DISCONTINUED | OUTPATIENT
Start: 2023-05-16 | End: 2023-05-17

## 2023-05-16 RX ORDER — ONDANSETRON 8 MG/1
1 TABLET, FILM COATED ORAL
Qty: 15 | Refills: 0
Start: 2023-05-16 | End: 2023-05-20

## 2023-05-16 RX ORDER — HYDROMORPHONE HYDROCHLORIDE 2 MG/ML
0.5 INJECTION INTRAMUSCULAR; INTRAVENOUS; SUBCUTANEOUS
Refills: 0 | Status: DISCONTINUED | OUTPATIENT
Start: 2023-05-16 | End: 2023-05-17

## 2023-05-16 RX ORDER — DOCUSATE SODIUM 100 MG
1 CAPSULE ORAL
Qty: 20 | Refills: 0
Start: 2023-05-16 | End: 2023-05-25

## 2023-05-16 RX ORDER — CEFAZOLIN SODIUM 1 G
2000 VIAL (EA) INJECTION ONCE
Refills: 0 | Status: COMPLETED | OUTPATIENT
Start: 2023-05-16 | End: 2023-05-16

## 2023-05-16 RX ORDER — SODIUM CHLORIDE 9 MG/ML
1000 INJECTION, SOLUTION INTRAVENOUS
Refills: 0 | Status: DISCONTINUED | OUTPATIENT
Start: 2023-05-16 | End: 2023-05-17

## 2023-05-16 RX ORDER — OXYCODONE HYDROCHLORIDE 5 MG/1
1 TABLET ORAL
Qty: 28 | Refills: 0
Start: 2023-05-16 | End: 2023-05-22

## 2023-05-16 RX ORDER — IBUPROFEN 200 MG
1 TABLET ORAL
Qty: 20 | Refills: 0
Start: 2023-05-16 | End: 2023-05-20

## 2023-05-16 RX ORDER — CHLORHEXIDINE GLUCONATE 213 G/1000ML
1 SOLUTION TOPICAL ONCE
Refills: 0 | Status: COMPLETED | OUTPATIENT
Start: 2023-05-16 | End: 2023-05-16

## 2023-05-16 RX ADMIN — SODIUM CHLORIDE 75 MILLILITER(S): 9 INJECTION, SOLUTION INTRAVENOUS at 16:00

## 2023-05-16 RX ADMIN — CHLORHEXIDINE GLUCONATE 1 APPLICATION(S): 213 SOLUTION TOPICAL at 10:44

## 2023-05-16 NOTE — ASU DISCHARGE PLAN (ADULT/PEDIATRIC) - CALL YOUR DOCTOR IF YOU HAVE ANY OF THE FOLLOWING:
Fever greater than (need to indicate Fahrenheit or Celsius) Bleeding that does not stop/Swelling that gets worse/Pain not relieved by Medications/Fever greater than (need to indicate Fahrenheit or Celsius)/Wound/Surgical Site with redness, or foul smelling discharge or pus/Numbness, tingling, color or temperature change to extremity/Nausea and vomiting that does not stop

## 2023-05-16 NOTE — ASU PATIENT PROFILE, ADULT - FALL HARM RISK - UNIVERSAL INTERVENTIONS
Bed in lowest position, wheels locked, appropriate side rails in place/Call bell, personal items and telephone in reach/Instruct patient to call for assistance before getting out of bed or chair/Non-slip footwear when patient is out of bed/[place] to call system/Physically safe environment - no spills, clutter or unnecessary equipment/Purposeful Proactive Rounding/Room/bathroom lighting operational, light cord in reach

## 2023-05-16 NOTE — BRIEF OPERATIVE NOTE - NSICDXBRIEFPOSTOP_GEN_ALL_CORE_FT
POST-OP DIAGNOSIS:  Superior glenoid labrum lesion of left shoulder 16-May-2023 14:09:14  Caden Shaffer  Biceps tendonitis, left 16-May-2023 14:09:08  Caden Shaffer

## 2023-05-16 NOTE — ASU PREOP CHECKLIST - WEIGHT IN KG
Please take oral iron daily   Please follow up with Dr. Zamora in 1 week   Please take and log your blood pressure daily - please stop losartan and coreg and change to metoprolol and monitor pressures   Please follow up with Dr. Zamora to see if a lower dose losartan would be more appropriate   99.8

## 2023-05-16 NOTE — BRIEF OPERATIVE NOTE - NSICDXBRIEFPREOP_GEN_ALL_CORE_FT
PRE-OP DIAGNOSIS:  Biceps tendonitis, left 16-May-2023 14:07:46  Caden Shaffer  Superior glenoid labrum lesion of left shoulder 16-May-2023 14:09:01  Caden Shaffer

## 2023-05-16 NOTE — ASU DISCHARGE PLAN (ADULT/PEDIATRIC) - CARE PROVIDER_API CALL
Pedro Gallardo)  [facility]var[facility]avicenc[facility]  [address] [place] [address]  Phone: [phone]  Fax: [phone]  Follow Up Time:

## 2023-05-16 NOTE — ASU DISCHARGE PLAN (ADULT/PEDIATRIC) - ACTIVITY LEVEL
No weight bearing/Elevate extremity No excercise/No heavy lifting/No sports/gym/No weight bearing/Elevate extremity

## 2023-06-02 ENCOUNTER — APPOINTMENT (OUTPATIENT)
Dept: ORTHOPEDIC SURGERY | Facility: CLINIC | Age: 38
End: 2023-06-02

## 2023-06-07 ENCOUNTER — APPOINTMENT (OUTPATIENT)
Dept: ORTHOPEDIC SURGERY | Facility: CLINIC | Age: 38
End: 2023-06-07
Payer: OTHER MISCELLANEOUS

## 2023-06-07 PROCEDURE — 99024 POSTOP FOLLOW-UP VISIT: CPT

## 2023-06-07 NOTE — PHYSICAL EXAM
[de-identified] : No erythema, no discharge, no increased warmth to touch. Healed incisions. \par \par The patient is a well appearing 38 year year old male of their stated age.\par Neck is supple & nontender to palpation. Negative Spurling's test.\par \par General: in no acute distress, seated comfortably, moving easily\par Skin: No discoloration, rashes; on palpation skin is dry, \par Neuro: Normal sensation all dermatomes, motor all myotomes\par Vascular: Normal pulses, no edema, normal temperature\par Coordination and balance: Normal\par Psych: normal mood and affect, non pressured speech, alert and oriented x3\par \par Effected Shoulder \par Inspection:\par Scapula Winging: Negative\par Deformity: None\par Erythema: None\par Ecchymosis: None\par Abrasions: None\par Effusion: None\par \par Range of Motion:\par Active Forward Flexion: 160 degrees \par Passive Forward Flexion: 170 degrees \par Active IR : L4\par Passive ER : 30 degrees\par \par Motor Exam:\par Forward Flexion: 4+ out of 5\par Flexion Plane of Scapula: 5 out of 5\par Abduction: 4+ out of 5\par Internal Rotation: 5 out of 5\par External Rotation: 4+ out of 5\par Distal Motor Strength: 5 out of 5\par \par Stability Testing:\par Anterior: 1+\par Posterior: 1+\par Sulcus N: 1+\par Sulcus ER: 1+\par \par Provocative Tests:\par Drop Arm: Negative\par Conley/Impingement: Positive\par [place]: Positive\par X-Arm Adduction: Negative\par Belly Press: Negative\par Bear Hug: Negative\par Lift Off: Negative\par Apprehension: Negative\par Relocation: Negative\par Posterior Load & Shift: Negative\par \par Palpation:\par AC Joint: Nontender\par Clavicle: Nontender\par SC Joint: Nontender\par Bicepital Groove: Positive\par Coracoid Process: Nontender\par Pectoralis Minor Tendon: Nontender\par Pectoralis Major Tendon: Nontender & palpably intact\par Latissimus Dorsi: Nontender \par Proximal Humerus: Positive\par Scapula Body: Nontender\par Medial Scapula Boarder: Nontender\par Scapula Spine: Nontender\par \par Neurologic Exam: Sensation to Light Touch:\par Axillary: Grossly intact\par Ulnar: Grossly intact\par Radial: Grossly intact\par Median: Grossly intact\par Other:  N/A\par \par Circulatory/Pulses:\par Ulnar: 2+\par Radial: 2+\par Other Pertinent Findings: None\par \par Contralateral Shoulder\par Range of Motion:\par Active Forward Flexion: 180 degrees \par Active Abduction: 180 degrees \par Passive Forward Flexion: 180 degrees \par Passive Abduction: 180 degrees \par ER @ 90 degrees: 90 degrees\par IR @ 90 degrees: 45 degrees\par ER @ 0 degrees: 50 degrees\par \par Motor Exam:\par Forward Flexion: 5 out of 5\par Flexion Plane of Scapula: 5 out of 5\par Abduction: 5 out of 5\par Internal Rotation: 5 out of 5\par External Rotation: 5 out of 5\par Distal Motor Strength: 5 out of 5\par \par Stability Testing:\par Anterior: 1+\par Posterior: 1+\par Sulcus N: 1+\par Sulcus ER: 1+\par \par Other Pertinent Findings: None\par

## 2023-06-07 NOTE — DISCUSSION/SUMMARY
[de-identified] : Assessment & Plan: The patient is approximately 2 weeks postoperative. Sutures removed and Steri Strips applied today. The patient is instructed in wound management. The patient's post-op plan, protocol and activity modifications have been thoroughly discussed and the patient expressed understanding. The patient will control pain as discussed & continue ice and elevation as needed. The patient otherwise may advance activity as discussed.\par \par Arthroscopy photos were reviewed in great detail.\par Prescription Medications Ordered: [None]\par Physical Therapy: Start PT and home exercise program, Apply Ice to affected area\par Braces/DME Ordered: Continue Postop Brace\par Activity/Work/Sports Status: [Out of work/gym/sports]\par Follow-Up: 4 weeks\par

## 2023-06-07 NOTE — HISTORY OF PRESENT ILLNESS
[Work related] : work related [Dull/Aching] : dull/aching [Throbbing] : throbbing [Constant] : constant [de-identified] : 03/01/2023 : JAY is here as a postoperative #1 visit s/p Arthroscopic SLAP Repair. Reports a sharp pain depending on his movement and position of his arm  Patient reports pain at night. He denies any numbness/tingling/fevers/chills. \par  [] : no [FreeTextEntry3] : 10/28/2021 [FreeTextEntry5] : JAY is here for Left Shoulder, reports a sharp pain depending on his movement and position of his arm

## 2023-06-07 NOTE — IMAGING
[de-identified] : No erythema, no discharge, no increased warmth to touch. Sutures intact. ROM consistent with immobility due to brace, strength testing deferred due to post-op status. Distally neurovascularly intact with median, radial, ulnar, MSC, axillary nerves intact. 2+ radial pulse with capillary refill >2 seconds\par

## 2023-06-07 NOTE — WORK
[Partial] : partial [Does not reveal pre-existing condition(s) that may affect treatment/prognosis] : does not reveal pre-existing condition(s) that may affect treatment/prognosis [Lifting] : lifting [Unknown at this time] : : unknown at this time [Patient] : patient [I provided the services listed above] :  I provided the services listed above.

## 2023-07-26 ENCOUNTER — APPOINTMENT (OUTPATIENT)
Dept: ORTHOPEDIC SURGERY | Facility: CLINIC | Age: 38
End: 2023-07-26

## 2023-08-09 ENCOUNTER — APPOINTMENT (OUTPATIENT)
Dept: ORTHOPEDIC SURGERY | Facility: CLINIC | Age: 38
End: 2023-08-09
Payer: OTHER MISCELLANEOUS

## 2023-08-09 PROCEDURE — 99214 OFFICE O/P EST MOD 30 MIN: CPT | Mod: 24

## 2023-08-09 PROCEDURE — 73030 X-RAY EXAM OF SHOULDER: CPT | Mod: LT

## 2023-08-09 RX ORDER — IBUPROFEN 600 MG/1
600 TABLET, FILM COATED ORAL 4 TIMES DAILY
Qty: 60 | Refills: 0 | Status: ACTIVE | COMMUNITY
Start: 2023-08-09 | End: 1900-01-01

## 2023-08-14 NOTE — DISCUSSION/SUMMARY
[de-identified] : Assessment & Plan: The patient is approximately 3 months postoperative. Sutures removed and Steri Strips applied today. The patient is instructed in wound management. The patient's post-op plan, protocol and activity modifications have been thoroughly discussed and the patient expressed understanding. The patient will control pain as discussed & continue ice and elevation as needed. The patient otherwise may advance activity as discussed.  Arthroscopy photos were reviewed in great detail. Prescription Medications Ordered: [None] Physical Therapy: C/w PT and home exercise program, Apply Ice to affected area Activity/Work/Sports Status: [Out of work/gym/sports] Follow-Up: 4 weeks

## 2023-08-14 NOTE — HISTORY OF PRESENT ILLNESS
[de-identified] : 08/09/23: JAY is here for POV#2 for L shoulder. 3 months postop. ROM improved. Residual anterior pain. Reports some difficult sleeping.   03/01/2023 : JAY is here as a postoperative #1 visit s/p Arthroscopic SLAP Repair. Reports a sharp pain depending on his movement and position of his arm  Patient reports pain at night. He denies any numbness/tingling/fevers/chills.   [FreeTextEntry1] : left shoulder  [FreeTextEntry5] : JAY is here today for left shoulder post op #3. c/o sharp pain at night. pain is decreasing.

## 2023-08-14 NOTE — PHYSICAL EXAM
[Left] : left shoulder [Components well fixed, in good position] : Components well fixed, in good position [de-identified] : No erythema, no discharge, no increased warmth to touch. Healed incisions. /30/L5, strength testing deferred due to post-op status. Distally neurovascularly intact with median, radial, ulnar, MSC, axillary nerves intact. 2+ radial pulse with capillary refill >2 seconds  [There are no fractures, subluxations or dislocations. No significant abnormalities are seen] : There are no fractures, subluxations or dislocations. No significant abnormalities are seen

## 2023-08-14 NOTE — WORK
[Partial] : partial [Does not reveal pre-existing condition(s) that may affect treatment/prognosis] : does not reveal pre-existing condition(s) that may affect treatment/prognosis [Lifting] : lifting [Unknown at this time] : : unknown at this time [Patient] : patient [I provided the services listed above] :  I provided the services listed above. [Cannot return to work because ________] : cannot return to work because [unfilled]

## 2023-10-18 ENCOUNTER — APPOINTMENT (OUTPATIENT)
Dept: ORTHOPEDIC SURGERY | Facility: CLINIC | Age: 38
End: 2023-10-18
Payer: OTHER MISCELLANEOUS

## 2023-10-18 PROCEDURE — 99214 OFFICE O/P EST MOD 30 MIN: CPT

## 2024-01-03 ENCOUNTER — APPOINTMENT (OUTPATIENT)
Dept: ORTHOPEDIC SURGERY | Facility: CLINIC | Age: 39
End: 2024-01-03
Payer: OTHER MISCELLANEOUS

## 2024-01-03 VITALS — BODY MASS INDEX: 35.36 KG/M2 | HEIGHT: 66 IN | WEIGHT: 220 LBS

## 2024-01-03 PROCEDURE — 99214 OFFICE O/P EST MOD 30 MIN: CPT

## 2024-01-03 NOTE — WORK
[Partial] : partial [Does not reveal pre-existing condition(s) that may affect treatment/prognosis] : does not reveal pre-existing condition(s) that may affect treatment/prognosis [Cannot return to work because ________] : cannot return to work because [unfilled] [Lifting] : lifting [Unknown at this time] : : unknown at this time [Patient] : patient [I provided the services listed above] :  I provided the services listed above.

## 2024-01-03 NOTE — IMAGING
[de-identified] : No erythema, no discharge, no increased warmth to touch. Healed incisions. /30/L5 with pain at extremes; clicking is noted with adduction, strength testing 4+/5 2/2 pain. Distally neurovascularly intact with median, radial, ulnar, MSC, axillary nerves intact. 2+ radial pulse with capillary refill >2 seconds

## 2024-01-03 NOTE — DISCUSSION/SUMMARY
[de-identified] : Assessment: The patient is a 38 year old male with left shoulder pain and physical exam findings consistent with S/P LABRAL REPAIR OF LEFT SHOULDER, along with BICEPS TENODESIS.  Patient and I discussed their symptoms. Discussed findings of today's exam and possible causes of patient's pain. Educated patient on their most probable diagnosis. Reviewed possible courses of treatment, and we collaboratively decided best course of treatment at this time will include:  1. Patient with pain and weakness in left shoulder. Will order MRI of left shoulder, Eval for possible new labral tear 2. Patient to continue with restricted duty at work  The patient's current medication management of their orthopedic diagnosis was reviewed today:   (1) We discussed a comprehensive treatment plan that included possible pharmaceutical management involving the use of prescription strength medications including but not limited to options such as oral Naprosyn 500mg BID, once daily Meloxicam 15 mg, or 500-650 mg Tylenol versus over the counter oral medications and topical prescription NSAID Pennsaid vs over the counter Voltaren gel.   (2) There is a moderate risk of morbidity with further treatment, especially from use of prescription strength medications and possible side effects of these medications which include upset stomach with oral medications, skin reactions to topical medications and cardiac/renal issues with long term use.   (3) I recommended that the patient follow-up with their medical physician to discuss any significant specific potential issues with long term medication use such as interactions with current medications or with exacerbation of underlying medical comorbidities.   (4) The benefits and risks associated with use of injectable, oral or topical, prescription and over the counter anti-inflammatory medications were discussed with the patient. The patient voiced understanding of the risks including but not limited to bleeding, stroke, kidney dysfunction, heart disease, and were referred to the black box warning label for further information.  Follow up after MRI

## 2024-01-03 NOTE — HISTORY OF PRESENT ILLNESS
[5] : 5 [4] : 4 [Dull/Aching] : dull/aching [Localized] : localized [Intermittent] : intermittent [Work] : work [Sleep] : sleep [Social interactions] : social interactions [Physical therapy] : physical therapy [Lying in bed] : lying in bed [de-identified] : 1/3/23: Pt reports left shoulder pain and discomfort has worsened since last visit. Reports experiencing tremors in his left hand and has numbness in his left shoulder. States numbness is not a new issue. Still with weakness and pain. Attending PT 3x a week at [facility]. Currently working full time, light duty  10/18/23: JAY is here today for f/up of LEFT shoulder. Pain and ROM improving with PT. Continues with a clicking sensation. Pain is worse at night. Currently working on light duty.   08/09/23: JAY is here for POV#2 for L shoulder. 3 months postop. ROM improved. Residual anterior pain. Reports some difficult sleeping.   03/01/2023 : JAY is here as a postoperative #1 visit s/p Arthroscopic SLAP Repair. Reports a sharp pain depending on his movement and position of his arm. Patient reports pain at night. He denies any numbness/tingling/fevers/chills.   [] : no [FreeTextEntry1] : left shoulder  [FreeTextEntry5] : JAY is here today for f/up of LEFT shoulder.  [de-identified] : 08/19/22 & 05/16/23

## 2024-01-31 ENCOUNTER — APPOINTMENT (OUTPATIENT)
Dept: ORTHOPEDIC SURGERY | Facility: CLINIC | Age: 39
End: 2024-01-31
Payer: OTHER MISCELLANEOUS

## 2024-01-31 PROCEDURE — 20611 DRAIN/INJ JOINT/BURSA W/US: CPT | Mod: LT

## 2024-01-31 PROCEDURE — J3490M: CUSTOM

## 2024-01-31 PROCEDURE — 99214 OFFICE O/P EST MOD 30 MIN: CPT | Mod: 25

## 2024-02-01 ENCOUNTER — APPOINTMENT (OUTPATIENT)
Dept: ORTHOPEDIC SURGERY | Facility: CLINIC | Age: 39
End: 2024-02-01
Payer: OTHER MISCELLANEOUS

## 2024-02-01 NOTE — HISTORY OF PRESENT ILLNESS
[5] : 5 [4] : 4 [Work] : work [Sleep] : sleep [Social interactions] : social interactions [Physical therapy] : physical therapy [Lying in bed] : lying in bed [de-identified] : 1/3/23: Pt reports left shoulder pain and discomfort has worsened since last visit. Reports experiencing tremors in his left hand and has numbness in his left shoulder. States numbness is not a new issue. Still with weakness and pain. Attending PT 3x a week at [facility]. Currently working full time, light duty  10/18/23: JAY is here today for f/up of LEFT shoulder. Pain and ROM improving with PT. Continues with a clicking sensation. Pain is worse at night. Currently working on light duty.   08/09/23: JAY is here for POV#2 for L shoulder. 3 months postop. ROM improved. Residual anterior pain. Reports some difficult sleeping.   03/01/2023 : JAY is here as a postoperative #1 visit s/p Arthroscopic SLAP Repair. Reports a sharp pain depending on his movement and position of his arm. Patient reports pain at night. He denies any numbness/tingling/fevers/chills.   [] : no [FreeTextEntry5] : JAY is here today for f/up of LEFT shoulder.  [FreeTextEntry1] : left shoulder  [de-identified] : 08/19/22 & 05/16/23

## 2024-02-01 NOTE — DISCUSSION/SUMMARY
[de-identified] : Assessment: The patient is a 38 year old male with left shoulder pain and physical exam findings consistent with S/P LABRAL REPAIR OF LEFT SHOULDER, along with BICEPS TENODESIS.  Patient and I discussed their symptoms. Discussed findings of today's exam and possible causes of patient's pain. Educated patient on their most probable diagnosis. Reviewed possible courses of treatment, and we collaboratively decided best course of treatment at this time will include:  1. Patient with pain and weakness in left shoulder. Will order MRI of left shoulder, Eval for possible new labral tear 2. Patient to continue with restricted duty at work  The patient's current medication management of their orthopedic diagnosis was reviewed today:   (1) We discussed a comprehensive treatment plan that included possible pharmaceutical management involving the use of prescription strength medications including but not limited to options such as oral Naprosyn 500mg BID, once daily Meloxicam 15 mg, or 500-650 mg Tylenol versus over the counter oral medications and topical prescription NSAID Pennsaid vs over the counter Voltaren gel.   (2) There is a moderate risk of morbidity with further treatment, especially from use of prescription strength medications and possible side effects of these medications which include upset stomach with oral medications, skin reactions to topical medications and cardiac/renal issues with long term use.   (3) I recommended that the patient follow-up with their medical physician to discuss any significant specific potential issues with long term medication use such as interactions with current medications or with exacerbation of underlying medical comorbidities.   (4) The benefits and risks associated with use of injectable, oral or topical, prescription and over the counter anti-inflammatory medications were discussed with the patient. The patient voiced understanding of the risks including but not limited to bleeding, stroke, kidney dysfunction, heart disease, and were referred to the black box warning label for further information.  Follow up after MRI

## 2024-02-01 NOTE — IMAGING
[de-identified] : No erythema, no discharge, no increased warmth to touch. Healed incisions. /30/L5 with pain at extremes; clicking is noted with adduction, strength testing 4+/5 2/2 pain. Distally neurovascularly intact with median, radial, ulnar, MSC, axillary nerves intact. 2+ radial pulse with capillary refill >2 seconds

## 2024-02-01 NOTE — DISCUSSION/SUMMARY
[de-identified] : Assessment: The patient is a 38 year old male with left shoulder pain and physical exam findings consistent with S/P LABRAL REPAIR OF LEFT SHOULDER, along with BICEPS TENODESIS.  Patient and I discussed their symptoms. Discussed findings of today's exam and possible causes of patient's pain. Educated patient on their most probable diagnosis. Reviewed possible courses of treatment, and we collaboratively decided best course of treatment at this time will include:  1. Patient with pain and weakness in left shoulder. Will order MRI of left shoulder, Eval for possible new labral tear 2. Patient to continue with restricted duty at work  The patient's current medication management of their orthopedic diagnosis was reviewed today:   (1) We discussed a comprehensive treatment plan that included possible pharmaceutical management involving the use of prescription strength medications including but not limited to options such as oral Naprosyn 500mg BID, once daily Meloxicam 15 mg, or 500-650 mg Tylenol versus over the counter oral medications and topical prescription NSAID Pennsaid vs over the counter Voltaren gel.   (2) There is a moderate risk of morbidity with further treatment, especially from use of prescription strength medications and possible side effects of these medications which include upset stomach with oral medications, skin reactions to topical medications and cardiac/renal issues with long term use.   (3) I recommended that the patient follow-up with their medical physician to discuss any significant specific potential issues with long term medication use such as interactions with current medications or with exacerbation of underlying medical comorbidities.   (4) The benefits and risks associated with use of injectable, oral or topical, prescription and over the counter anti-inflammatory medications were discussed with the patient. The patient voiced understanding of the risks including but not limited to bleeding, stroke, kidney dysfunction, heart disease, and were referred to the black box warning label for further information.  Follow up after MRI

## 2024-02-01 NOTE — PROCEDURE
[FreeTextEntry3] : Patient Identification  Name/: Verbal with patient and/or family    Procedure Verification:  Procedure confirmed with patient or family/designee  Consent for procedure: Verbal Consent Given  Relevant documentation completed, reviewed, and signed  Clinical indications for procedure confirmed    Time-out with all members of procedure team immediately prior to procedure:  Correct patient identified. Agreement on procedure. Correct side and site.    ULTRASOUND GUIDED SHOULDER GLENOHUMERAL JOINT INJECTION - LEFT  After verbal consent and identification of the correct patient and correct site, the posterior left shoulder was prepped using alcohol swabs and betadine. This was allowed time to air dry. After ethyl choride spray for skin anesthesia, a mixture of 1cc Celestone 6mg/ml, 3cc Lidocaine 1%, and 3cc Bupivacaine 0.5% was injected under ultrasound guidance into the GLENOHUMERAL JOINT from posterior using a sterile 22G needle. Visualization of the needle and placement of the injection was performed without any complications. Ultrasound was used for visualization, precise injection in area of tear, and / or prior failure or difficult injection. The patient tolerated the procedure well. After-care instructions were provided and included instructions to ice the area and to call if redness, pain, or fever develop.

## 2024-02-01 NOTE — HISTORY OF PRESENT ILLNESS
[5] : 5 [4] : 4 [Work] : work [Sleep] : sleep [Social interactions] : social interactions [Physical therapy] : physical therapy [Lying in bed] : lying in bed [de-identified] : 1/3/23: Pt reports left shoulder pain and discomfort has worsened since last visit. Reports experiencing tremors in his left hand and has numbness in his left shoulder. States numbness is not a new issue. Still with weakness and pain. Attending PT 3x a week at [facility]. Currently working full time, light duty  10/18/23: JAY is here today for f/up of LEFT shoulder. Pain and ROM improving with PT. Continues with a clicking sensation. Pain is worse at night. Currently working on light duty.   08/09/23: JAY is here for POV#2 for L shoulder. 3 months postop. ROM improved. Residual anterior pain. Reports some difficult sleeping.   03/01/2023 : JAY is here as a postoperative #1 visit s/p Arthroscopic SLAP Repair. Reports a sharp pain depending on his movement and position of his arm. Patient reports pain at night. He denies any numbness/tingling/fevers/chills.   [] : no [FreeTextEntry1] : left shoulder  [FreeTextEntry5] : JAY is here today for f/up of LEFT shoulder.  [de-identified] : 08/19/22 & 05/16/23

## 2024-02-01 NOTE — DATA REVIEWED
[MRI] : MRI [Left] : left [Shoulder] : shoulder [Report was reviewed and noted in the chart] : The report was reviewed and noted in the chart [I independently reviewed and interpreted images and report] : I independently reviewed and interpreted images and report [I reviewed the films/CD] : I reviewed the films/CD [FreeTextEntry1] : [facility] 1/26/24 MR ARTHROGRAM: 1. Diffuse tearing/ maceration of the anterosuperior, superior posterosuperior, posterior and posteroinferior quadrants of the glenoid labrum with uplifting from the subjacent glenoid rim posteriorly 2. 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 Intact rotator cuff 4. Full-thickness tearing versus prior tenotomy of the intra-articular long head biceps tendon with diminution of the extra-articular portion

## 2024-02-01 NOTE — IMAGING
[de-identified] : No erythema, no discharge, no increased warmth to touch. Healed incisions. /30/L5 with pain at extremes; clicking is noted with adduction, strength testing 4+/5 2/2 pain. Distally neurovascularly intact with median, radial, ulnar, MSC, axillary nerves intact. 2+ radial pulse with capillary refill >2 seconds

## 2024-02-05 ENCOUNTER — APPOINTMENT (OUTPATIENT)
Dept: ORTHOPEDIC SURGERY | Facility: CLINIC | Age: 39
End: 2024-02-05
Payer: OTHER MISCELLANEOUS

## 2024-02-05 PROCEDURE — 99204 OFFICE O/P NEW MOD 45 MIN: CPT

## 2024-02-05 RX ORDER — DICLOFENAC SODIUM 75 MG/1
75 TABLET, DELAYED RELEASE ORAL TWICE DAILY
Qty: 60 | Refills: 1 | Status: ACTIVE | COMMUNITY
Start: 2024-02-05 | End: 1900-01-01
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
## 2024-02-05 NOTE — ASSESSMENT
[FreeTextEntry1] : mra left shoulder [facility] 1/26/24 - labral tearing, high chondral loss / chondromalacia worst post gleniod, po changes biceps, RTC tendinopaty but no tearing   - We discussed their diagnosis and treatment options at length including the risks and benefits of both surgical and non-surgical options. - Due to risks of surgery, we will continue conservative treatment with PT, icing, and anti-inflammatory medications - The patient was provided with a prescription to work on scapular strengthening and rotator cuff strengthening. - The patient was advised to let pain guide the gradual advancement of activities. - diclofenac rx - Patient was given a prescription for an anti-inflammatory medication.  They will take it for the next week and then on an as needed basis, as long as there are no medical contra-indications.  Patient is counseled on possible GI, renal, and cardiovascular side effects. - I think majority of his symptoms from chondral loss, RTC tendinopathy and impingmeent exacerbaiton along with contonued bicep tendontiis , he doesnt seem to have instabilty and i would  hold off on another labral surgery due to risks - Follow up with Dr. Gallardo

## 2024-02-05 NOTE — IMAGING
[de-identified] :  LEFT SHOULDER Inspection: No swelling.  Palpation: Tenderness is noted at the bicipital groove, anterior and lateral.  Range of motion: There is pain with range of motion. , ER 55, @90ER 90, @90IR 30 Strength: There is pain and discomfort with strength testing. Forward Flexion 4/5. Abduction 4/5.  External Rotation 5-/5 and Internal Rotation 5/5  Neurological testings: motor and sensor intact distally. Ligament Stability and Special Tests:  There is positive arc of pain.  Shoulder apprehension: neg Shoulder relocation: neg Obriens test: pos Biceps Active test: neg Ferraro Labral Shear: neg Impingement testing: pos Greg testing: pos Whipple: pos Cross Body Adduction: neg

## 2024-02-28 ENCOUNTER — APPOINTMENT (OUTPATIENT)
Dept: ORTHOPEDIC SURGERY | Facility: CLINIC | Age: 39
End: 2024-02-28
Payer: OTHER MISCELLANEOUS

## 2024-02-28 DIAGNOSIS — M75.22 BICIPITAL TENDINITIS, LEFT SHOULDER: ICD-10-CM

## 2024-02-28 DIAGNOSIS — S43.439A SUPERIOR GLENOID LABRUM LESION OF UNSPECIFIED SHOULDER, INITIAL ENCOUNTER: ICD-10-CM

## 2024-02-28 DIAGNOSIS — Z48.89 ENCOUNTER FOR OTHER SPECIFIED SURGICAL AFTERCARE: ICD-10-CM

## 2024-02-28 DIAGNOSIS — M75.42 IMPINGEMENT SYNDROME OF LEFT SHOULDER: ICD-10-CM

## 2024-02-28 DIAGNOSIS — Z98.890 OTHER SPECIFIED POSTPROCEDURAL STATES: ICD-10-CM

## 2024-02-28 DIAGNOSIS — M67.819 OTHER SPECIFIED DISORDERS OF SYNOVIUM AND TENDON, UNSPECIFIED SHOULDER: ICD-10-CM

## 2024-02-28 DIAGNOSIS — M94.212 CHONDROMALACIA, LEFT SHOULDER: ICD-10-CM

## 2024-02-28 PROCEDURE — 99214 OFFICE O/P EST MOD 30 MIN: CPT

## 2024-03-01 PROBLEM — S43.439A LABRAL TEAR OF SHOULDER: Status: ACTIVE | Noted: 2022-06-06

## 2024-03-01 PROBLEM — M75.42 IMPINGEMENT SYNDROME OF LEFT SHOULDER: Status: ACTIVE | Noted: 2024-02-01

## 2024-03-01 PROBLEM — M67.819 TENDINOSIS OF ROTATOR CUFF: Status: ACTIVE | Noted: 2022-06-06

## 2024-03-01 PROBLEM — Z98.890 STATUS POST LABRAL REPAIR OF SHOULDER: Status: ACTIVE | Noted: 2022-08-31

## 2024-03-01 PROBLEM — M75.22 BICEPS TENDINITIS OF LEFT UPPER EXTREMITY: Status: ACTIVE | Noted: 2023-02-14

## 2024-03-01 PROBLEM — M94.212 CHONDROMALACIA OF LEFT SHOULDER: Status: ACTIVE | Noted: 2024-02-01

## 2024-03-01 PROBLEM — Z48.89 AFTERCARE FOLLOWING SURGERY: Status: ACTIVE | Noted: 2022-08-31

## 2024-03-01 NOTE — HISTORY OF PRESENT ILLNESS
[de-identified] : 1/3/23: Pt reports left shoulder pain and discomfort has worsened since last visit. Reports experiencing tremors in his left hand and has numbness in his left shoulder. States numbness is not a new issue. Still with weakness and pain. Attending PT 3x a week at [facility]. Currently working full time, light duty  03/01/2023 : JAY is here as a postoperative #1 visit s/p Arthroscopic SLAP Repair. Reports a sharp pain depending on his movement and position of his arm. Patient reports pain at night. He denies any numbness/tingling/fevers/chills.   08/09/23: JAY is here for POV#2 for L shoulder. 3 months postop. ROM improved. Residual anterior pain. Reports some difficult sleeping.   10/18/23: JAY is here today for f/up of LEFT shoulder. Pain and ROM improving with PT. Continues with a clicking sensation. Pain is worse at night. Currently working on light duty.   01/31/24: JAY here to f/up on his LT shoulder and review MRI results. No improvement since last visit. Remains with weakness and numbness in in left shoulder. Pain is worse at night. D/c PT last visit.   02/28/24: JAY is here to f/up on his LEFT shoulder. No changes since last seen. remains with weakness and pain, worse at night. CSI at last visit didn't provide any relief. Saw Dr. Munroe for 2nd opinion.     [FreeTextEntry1] : LEFT shoulder  [FreeTextEntry5] : JAY is here today to follow up on his LEFT shoulder.

## 2024-03-01 NOTE — DISCUSSION/SUMMARY
[de-identified] : - We discussed their diagnosis and treatment options at length including the risks and benefits of both surgical and non-surgical options. - Due to risks of surgery, we will continue conservative treatment with PT, icing, and anti-inflammatory medications - The patient was provided with a prescription to work on scapular strengthening and rotator cuff strengthening. - The patient was advised to let pain guide the gradual advancement of activities. - diclofenac rx - Patient was given a prescription for an anti-inflammatory medication. They will take it for the next week and then on an as needed basis, as long as there are no medical contra-indications. Patient is counseled on possible GI, renal, and cardiovascular side effects. - I think majority of his symptoms from chondral loss, RTC tendinopathy and impingement exacerbation along with continued bicep tendonitis , he doesn't seem to have instability and i would hold off on another labral surgery due to risks
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
## 2024-03-01 NOTE — DATA REVIEWED
[Left] : left [MRI] : MRI [Report was reviewed and noted in the chart] : The report was reviewed and noted in the chart [Shoulder] : shoulder [I reviewed the films/CD] : I reviewed the films/CD [I independently reviewed and interpreted images and report] : I independently reviewed and interpreted images and report [FreeTextEntry1] : [facility] 1/26/24 MR ARTHROGRAM: 1. Diffuse tearing/ maceration of the anterosuperior, superior posterosuperior, posterior and posteroinferior quadrants of the glenoid labrum with uplifting from the subjacent glenoid rim posteriorly 2. 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 Intact rotator cuff 4. Full-thickness tearing versus prior tenotomy of the intra-articular long head biceps tendon with diminution of the extra-articular portion

## 2024-03-01 NOTE — WORK
[Partial] : partial [Does not reveal pre-existing condition(s) that may affect treatment/prognosis] : does not reveal pre-existing condition(s) that may affect treatment/prognosis [Unknown at this time] : : unknown at this time [Lifting] : lifting [Patient] : patient [I provided the services listed above] :  I provided the services listed above. [Light Work:] : Light Work: Exerting up to 20 pounds of force occasionally, and/or up to 10 pounds of force frequently and/or negligible amount of force constantly to move objects. Physical demand requirements are in excess of those for Sedentary Work. Even though the weight lifted may only be a negligible amount, a job should be rated Light Work: (1) when it requires walking or standing to a significant degree: or (2) when it requires sitting most of the time but entails pushing and/or pulling of arm or leg controls: and/or (3) when the job requires working at a production rate pace entailing the constant pushing and/or pulling of materials even though the weight of those materials is negligible. NOTE: The constant stress of maintaining a production rate pace, especially in an industrial setting, can be and is physically demanding of a worker even though the amount of force exerted is negligible.

## 2024-05-31 ENCOUNTER — APPOINTMENT (OUTPATIENT)
Dept: ORTHOPEDIC SURGERY | Facility: CLINIC | Age: 39
End: 2024-05-31

## 2024-07-26 ENCOUNTER — APPOINTMENT (OUTPATIENT)
Dept: ORTHOPEDIC SURGERY | Facility: CLINIC | Age: 39
End: 2024-07-26

## 2024-08-05 ENCOUNTER — APPOINTMENT (OUTPATIENT)
Dept: ORTHOPEDIC SURGERY | Facility: CLINIC | Age: 39
End: 2024-08-05

## 2024-08-05 PROCEDURE — 20611 DRAIN/INJ JOINT/BURSA W/US: CPT | Mod: LT

## 2024-08-05 PROCEDURE — 99214 OFFICE O/P EST MOD 30 MIN: CPT | Mod: 25

## 2024-08-05 NOTE — HISTORY OF PRESENT ILLNESS
[de-identified] : 8/5/52024: pt here for f/u L shoulder, doing well overall. He was doing PT 3x/wk, last session 8/2/2024. Pt states he was seeing some improvements in ROM in the L shoulder, but still lacking strength and c/o pain with sleeping. Continues to work light duty  02/28/24: JAY is here to f/up on his LEFT shoulder. No changes since last seen. remains with weakness and pain, worse at night. CSI at last visit didn't provide any relief. Saw Dr. Munroe for 2nd opinion.   01/31/24: JAY here to f/up on his LT shoulder and review MRI results. No improvement since last visit. Remains with weakness and numbness in in left shoulder. Pain is worse at night. D/c PT last visit.   10/18/23: JAY is here today for f/up of LEFT shoulder. Pain and ROM improving with PT. Continues with a clicking sensation. Pain is worse at night. Currently working on light duty.   08/09/23: JAY is here for POV#2 for L shoulder. 3 months postop. ROM improved. Residual anterior pain. Reports some difficult sleeping.   03/01/2023 : JAY is here as a postoperative #1 visit s/p Arthroscopic SLAP Repair. Reports a sharp pain depending on his movement and position of his arm. Patient reports pain at night. He denies any numbness/tingling/fevers/chills.   1/3/23: Pt reports left shoulder pain and discomfort has worsened since last visit. Reports experiencing tremors in his left hand and has numbness in his left shoulder. States numbness is not a new issue. Still with weakness and pain. Attending PT 3x a week at [facility]. Currently working full time, light duty

## 2024-08-05 NOTE — DISCUSSION/SUMMARY
[de-identified] : Assessment:   39 year male with history, physical exam and imaging consistent with: chondral loss, RTC tendinopathy and impingement exacerbation along with continued bicep tendonitis     ---------------------------     Plan: - CSI of the left shoulder during visit - pedro well - Etodolac 200mg rx - Continue light duty    Follow-up:  ***

## 2024-08-05 NOTE — WORK
[Partial] : partial [Does not reveal pre-existing condition(s) that may affect treatment/prognosis] : does not reveal pre-existing condition(s) that may affect treatment/prognosis [Lifting] : lifting [Unknown at this time] : : unknown at this time [Patient] : patient [I provided the services listed above] :  I provided the services listed above. [Light Work:] : Light Work: Exerting up to 20 pounds of force occasionally, and/or up to 10 pounds of force frequently and/or negligible amount of force constantly to move objects. Physical demand requirements are in excess of those for Sedentary Work. Even though the weight lifted may only be a negligible amount, a job should be rated Light Work: (1) when it requires walking or standing to a significant degree: or (2) when it requires sitting most of the time but entails pushing and/or pulling of arm or leg controls: and/or (3) when the job requires working at a production rate pace entailing the constant pushing and/or pulling of materials even though the weight of those materials is negligible. NOTE: The constant stress of maintaining a production rate pace, especially in an industrial setting, can be and is physically demanding of a worker even though the amount of force exerted is negligible.

## 2024-08-05 NOTE — IMAGING
[de-identified] : No erythema, no discharge, no increased warmth to touch. Healed incisions. /30/L5 with pain at extremes; clicking is noted with adduction, strength testing 4+/5 2/2 pain. Distally neurovascularly intact with median, radial, ulnar, MSC, axillary nerves intact. 2+ radial pulse with capillary refill >2 seconds

## 2024-10-28 ENCOUNTER — APPOINTMENT (OUTPATIENT)
Dept: ORTHOPEDIC SURGERY | Facility: CLINIC | Age: 39
End: 2024-10-28

## 2024-11-18 ENCOUNTER — APPOINTMENT (OUTPATIENT)
Dept: ORTHOPEDIC SURGERY | Facility: CLINIC | Age: 39
End: 2024-11-18

## 2024-11-18 VITALS — BODY MASS INDEX: 35.36 KG/M2 | HEIGHT: 66 IN | WEIGHT: 220 LBS

## 2024-11-18 DIAGNOSIS — M94.212 CHONDROMALACIA, LEFT SHOULDER: ICD-10-CM

## 2024-11-18 DIAGNOSIS — M67.819 OTHER SPECIFIED DISORDERS OF SYNOVIUM AND TENDON, UNSPECIFIED SHOULDER: ICD-10-CM

## 2024-11-18 DIAGNOSIS — Z98.890 OTHER SPECIFIED POSTPROCEDURAL STATES: ICD-10-CM

## 2024-11-18 DIAGNOSIS — S43.439A SUPERIOR GLENOID LABRUM LESION OF UNSPECIFIED SHOULDER, INITIAL ENCOUNTER: ICD-10-CM

## 2024-11-18 DIAGNOSIS — M75.22 BICIPITAL TENDINITIS, LEFT SHOULDER: ICD-10-CM

## 2024-11-18 DIAGNOSIS — M75.42 IMPINGEMENT SYNDROME OF LEFT SHOULDER: ICD-10-CM

## 2024-11-18 PROCEDURE — 99214 OFFICE O/P EST MOD 30 MIN: CPT

## 2024-11-18 RX ORDER — IBUPROFEN 800 MG/1
800 TABLET, FILM COATED ORAL
Qty: 90 | Refills: 0 | Status: ACTIVE | COMMUNITY
Start: 2024-11-18 | End: 1900-01-01

## 2025-02-19 ENCOUNTER — APPOINTMENT (OUTPATIENT)
Dept: ORTHOPEDIC SURGERY | Facility: CLINIC | Age: 40
End: 2025-02-19

## (undated) DEVICE — TUBING SUCTION 20FT

## (undated) DEVICE — POSITIONER STRAP ARMBOARD VELCRO TS-30 12

## (undated) DEVICE — GLV 7.5 PROTEXIS (WHITE)

## (undated) DEVICE — SUT LASSO QUICKPASS TIGHT CRV LT 25 DEG

## (undated) DEVICE — DVC SUCTION FLR PUDDLE GUPPY

## (undated) DEVICE — TUBING DEPUY MITEK FMS VUE INFLOW

## (undated) DEVICE — DRAPE IOBAN 23" X 23"

## (undated) DEVICE — BUR LINVATEC OVAL 6MM

## (undated) DEVICE — DRAPE U POLY BLUE 60X72"

## (undated) DEVICE — SHAVER BLADE GATOR 4.8MM

## (undated) DEVICE — SOL IRR BAG NS 0.9% 3000ML

## (undated) DEVICE — GLV 8 PROTEXIS (WHITE)

## (undated) DEVICE — BUR OVAL 4.0

## (undated) DEVICE — POSITIONER STIRRUP STRAP W SLIP RING 19X3.5"

## (undated) DEVICE — DRAPE 3/4 SHEET 52X76"

## (undated) DEVICE — BAG SPONGE COUNTER EZ

## (undated) DEVICE — CANNULA ARTHREX TWIST IN 6MMX7CM

## (undated) DEVICE — DRAPE TOP SHEET 53" X 101"

## (undated) DEVICE — PACK SHOULDER ARTHROSCOPY

## (undated) DEVICE — DRSG RESTRAINT LIMB WRAP AROUND

## (undated) DEVICE — S&N ARTHROCARE WAND TURBOVAC 90 DEGREE

## (undated) DEVICE — ELCTR VAPR COOL PULSE

## (undated) DEVICE — SHAVER BLADE ULTRAGATOR STEALTH 4.2MM

## (undated) DEVICE — BOOT COVER NONSKID IMPERVIOUS

## (undated) DEVICE — SUT ORTHOCORD 2 36"

## (undated) DEVICE — WRAP COMPRESSION CALF MED

## (undated) DEVICE — SHAVER BLADE GATOR 4.2MM 15DEG

## (undated) DEVICE — SUT NDL EXPRESSEW II

## (undated) DEVICE — BLANKET WARMER LOWER ADULT

## (undated) DEVICE — POSITIONER FOAM HEAD CRADLE (PINK)

## (undated) DEVICE — DRAPE TOP SHEET 53"X101"

## (undated) DEVICE — DRAPE U 47X51" LF STERILE

## (undated) DEVICE — CANNULA ARTHREX TWIST IN NO SQUIRT CAP 7X7 PURPLE

## (undated) DEVICE — SHAVER BLADE GREAT WHITE 4.8MM

## (undated) DEVICE — VENODYNE/SCD SLEEVE CALF MEDIUM

## (undated) DEVICE — SUT MONOSOF 3-0 18" C-14

## (undated) DEVICE — CANNULA LINVATEC SHOULDER 7CM (GREY & ORANGE)

## (undated) DEVICE — DRAPE SPLIT SHEETS 77X108"

## (undated) DEVICE — POSITIONER STRAP ARMBOARD VELCRO TS-30

## (undated) DEVICE — SUT LASSO 25 DEGREE RIGHT TIGHT CURVE

## (undated) DEVICE — SLING SHOULDER ABDUCTION LARGE

## (undated) DEVICE — POSITIONER FOAM HEAD CRADLE

## (undated) DEVICE — NDL SPINAL 18G X 3.5" (PINK)

## (undated) DEVICE — DRSG SLING ULTRA W ABDUCTION PILLOW LG

## (undated) DEVICE — BUR OVAL 5MM

## (undated) DEVICE — SYR LUER LOK 50CC

## (undated) DEVICE — TUBING DEPUY MITEK FMS OUTFLOW

## (undated) DEVICE — POSITIONER S&N SPIDER STABILIZATION KIT SHOULDER

## (undated) DEVICE — SUT LASSO 90 DEGREE CURVE STRAIGHT

## (undated) DEVICE — NDL SPINAL 18G X 3.5"

## (undated) DEVICE — POSITIONER POSITIONING ROLL  5X17"

## (undated) DEVICE — WARMING BLANKET LOWER ADULT

## (undated) DEVICE — GLV 7.5 PROTEXIS

## (undated) DEVICE — CANNULA LINVATEC SHOULDER 7CM GREY & ORANGE

## (undated) DEVICE — GLV 8 PROTEXIS

## (undated) DEVICE — KIT FIBERTAK CURVED DISP

## (undated) DEVICE — DRAPE IOBAN 23X23"

## (undated) DEVICE — ARTHREX MULTIFIRE SCORPION NEEDLE

## (undated) DEVICE — POSITIONER S&N MASK FACE SPIDER 2

## (undated) DEVICE — POSITIONER S&N FACE MASK SPIDER 2